# Patient Record
Sex: MALE | Race: WHITE | NOT HISPANIC OR LATINO | Employment: OTHER | URBAN - METROPOLITAN AREA
[De-identification: names, ages, dates, MRNs, and addresses within clinical notes are randomized per-mention and may not be internally consistent; named-entity substitution may affect disease eponyms.]

---

## 2018-03-13 ENCOUNTER — OFFICE VISIT (OUTPATIENT)
Dept: CARDIAC SURGERY | Facility: CLINIC | Age: 83
End: 2018-03-13

## 2018-03-13 VITALS
WEIGHT: 211 LBS | SYSTOLIC BLOOD PRESSURE: 136 MMHG | HEIGHT: 69 IN | BODY MASS INDEX: 31.25 KG/M2 | TEMPERATURE: 99.4 F | HEART RATE: 81 BPM | DIASTOLIC BLOOD PRESSURE: 76 MMHG | OXYGEN SATURATION: 84 %

## 2018-03-13 DIAGNOSIS — R91.1 LUNG NODULE: Primary | ICD-10-CM

## 2018-03-13 PROCEDURE — 99203 OFFICE O/P NEW LOW 30 MIN: CPT | Performed by: THORACIC SURGERY (CARDIOTHORACIC VASCULAR SURGERY)

## 2018-03-13 RX ORDER — MELATONIN
2000 DAILY
COMMUNITY

## 2018-03-13 RX ORDER — ASPIRIN 81 MG/1
81 TABLET ORAL DAILY
COMMUNITY

## 2018-03-13 RX ORDER — METOPROLOL TARTRATE 100 MG/1
100 TABLET ORAL 2 TIMES DAILY
COMMUNITY
Start: 2018-02-14

## 2018-03-13 RX ORDER — POLYETHYLENE GLYCOL 3350 17 G/17G
17 POWDER, FOR SOLUTION ORAL DAILY
COMMUNITY

## 2018-03-13 RX ORDER — LISINOPRIL 40 MG/1
20 TABLET ORAL DAILY
COMMUNITY
Start: 2018-01-12

## 2018-03-13 RX ORDER — DOXAZOSIN 2 MG/1
2 TABLET ORAL 2 TIMES DAILY
COMMUNITY
Start: 2018-01-12

## 2018-03-13 RX ORDER — ROSUVASTATIN CALCIUM 20 MG/1
20 TABLET, COATED ORAL NIGHTLY
COMMUNITY
Start: 2018-02-14

## 2018-03-14 DIAGNOSIS — R91.8 LUNG MASS: Primary | ICD-10-CM

## 2018-03-20 ENCOUNTER — TRANSCRIBE ORDERS (OUTPATIENT)
Dept: CARDIAC SURGERY | Facility: CLINIC | Age: 83
End: 2018-03-20

## 2018-03-20 DIAGNOSIS — R91.1 LUNG NODULE: Primary | ICD-10-CM

## 2018-03-21 ENCOUNTER — DOCUMENTATION (OUTPATIENT)
Dept: OTHER | Facility: HOSPITAL | Age: 83
End: 2018-03-21

## 2018-03-21 ENCOUNTER — HOSPITAL ENCOUNTER (OUTPATIENT)
Dept: CT IMAGING | Facility: HOSPITAL | Age: 83
Discharge: HOME | End: 2018-03-21
Admitting: PHYSICIAN ASSISTANT

## 2018-03-21 VITALS
DIASTOLIC BLOOD PRESSURE: 51 MMHG | WEIGHT: 208.6 LBS | TEMPERATURE: 97.6 F | BODY MASS INDEX: 30.9 KG/M2 | SYSTOLIC BLOOD PRESSURE: 117 MMHG | HEIGHT: 69 IN | RESPIRATION RATE: 18 BRPM | HEART RATE: 77 BPM | OXYGEN SATURATION: 91 %

## 2018-03-21 DIAGNOSIS — R91.8 LUNG MASS: ICD-10-CM

## 2018-03-21 LAB
APTT PPP: 26.4 SECONDS (ref 24–31)
GLUCOSE BLDC GLUCOMTR-MCNC: 94 MG/DL (ref 70–130)
INR PPP: 1.06 (ref 0.91–1.09)
PLATELET # BLD AUTO: 185 10*3/MM3 (ref 150–450)
PROTHROMBIN TIME: 11.1 SECONDS (ref 9.6–11.5)

## 2018-03-21 PROCEDURE — 87116 MYCOBACTERIA CULTURE: CPT | Performed by: PHYSICIAN ASSISTANT

## 2018-03-21 PROCEDURE — 87206 SMEAR FLUORESCENT/ACID STAI: CPT | Performed by: PHYSICIAN ASSISTANT

## 2018-03-21 PROCEDURE — 88112 CYTOPATH CELL ENHANCE TECH: CPT | Performed by: PHYSICIAN ASSISTANT

## 2018-03-21 PROCEDURE — A9270 NON-COVERED ITEM OR SERVICE: HCPCS | Performed by: RADIOLOGY

## 2018-03-21 PROCEDURE — 87015 SPECIMEN INFECT AGNT CONCNTJ: CPT | Performed by: PHYSICIAN ASSISTANT

## 2018-03-21 PROCEDURE — 75989 ABSCESS DRAINAGE UNDER X-RAY: CPT

## 2018-03-21 PROCEDURE — 87102 FUNGUS ISOLATION CULTURE: CPT | Performed by: PHYSICIAN ASSISTANT

## 2018-03-21 PROCEDURE — 87070 CULTURE OTHR SPECIMN AEROBIC: CPT | Performed by: PHYSICIAN ASSISTANT

## 2018-03-21 PROCEDURE — 87205 SMEAR GRAM STAIN: CPT | Performed by: PHYSICIAN ASSISTANT

## 2018-03-21 PROCEDURE — 87075 CULTR BACTERIA EXCEPT BLOOD: CPT | Performed by: PHYSICIAN ASSISTANT

## 2018-03-21 PROCEDURE — 85049 AUTOMATED PLATELET COUNT: CPT | Performed by: RADIOLOGY

## 2018-03-21 PROCEDURE — 85610 PROTHROMBIN TIME: CPT | Performed by: RADIOLOGY

## 2018-03-21 PROCEDURE — 82962 GLUCOSE BLOOD TEST: CPT

## 2018-03-21 PROCEDURE — 63710000001 ALPRAZOLAM 0.5 MG TABLET: Performed by: RADIOLOGY

## 2018-03-21 PROCEDURE — C1729 CATH, DRAINAGE: HCPCS

## 2018-03-21 PROCEDURE — 85730 THROMBOPLASTIN TIME PARTIAL: CPT | Performed by: RADIOLOGY

## 2018-03-21 RX ORDER — LIDOCAINE HYDROCHLORIDE 10 MG/ML
20 INJECTION, SOLUTION INFILTRATION; PERINEURAL ONCE
Status: COMPLETED | OUTPATIENT
Start: 2018-03-21 | End: 2018-03-21

## 2018-03-21 RX ORDER — AMLODIPINE BESYLATE 10 MG/1
10 TABLET ORAL DAILY
COMMUNITY
End: 2018-07-02 | Stop reason: CLARIF

## 2018-03-21 RX ORDER — SODIUM CHLORIDE 0.9 % (FLUSH) 0.9 %
1-10 SYRINGE (ML) INJECTION AS NEEDED
Status: DISCONTINUED | OUTPATIENT
Start: 2018-03-21 | End: 2018-03-22 | Stop reason: HOSPADM

## 2018-03-21 RX ORDER — ALPRAZOLAM 0.5 MG/1
0.5 TABLET ORAL
Status: COMPLETED | OUTPATIENT
Start: 2018-03-21 | End: 2018-03-21

## 2018-03-21 RX ADMIN — LIDOCAINE HYDROCHLORIDE 20 ML: 10 INJECTION, SOLUTION INFILTRATION; PERINEURAL at 10:45

## 2018-03-21 RX ADMIN — ALPRAZOLAM 0.5 MG: 0.5 TABLET ORAL at 10:17

## 2018-03-21 NOTE — NURSING NOTE
Contacted Scotty at Dr. Mesa's office and informed him that the patient had 2 liters fluid removed via thoracentesis and that Dr. Bright is going to recommend the nodules be evaluated via Navigational Bronchoscopy.  Scotty reports that he will relay information to Dr. Mesa and get that set up for patient.

## 2018-03-21 NOTE — NURSING NOTE
Procedure complete, pt tolerated very well.  Dr. Bright removed 2L pleural fluid from right side, samples sent to lab.  Site cleaned and band-aid applied. Report called to [facility] nurse and pt returned to [facility] via hospital transport.

## 2018-03-21 NOTE — NURSING NOTE
Pt discharged to home s/p thoracentesis.  Pt tolerated procedure without complications.  Discharge instructions reviewed with patient and spouse.  Both verbalized understanding.  Transported to exit via wheelchair per Rn.

## 2018-03-21 NOTE — PROGRESS NOTES
Received phone call from Fatmata in [facility] today stating wife indicated they needed help getting more O2 at home. O2 supplier is [employer] out of [place]. Called this office and staff indicated the patient just needed to call as the office had everything they needed to service them. Went to [facility] to relay this information to patient and his wife. They v/u. Introduced lung navigator role and provided contact information. Encouraged them to call with questions or concerns.

## 2018-03-22 ENCOUNTER — TELEPHONE (OUTPATIENT)
Dept: INTERVENTIONAL RADIOLOGY/VASCULAR | Facility: HOSPITAL | Age: 83
End: 2018-03-22

## 2018-03-22 LAB
LAB AP CASE REPORT: NORMAL
Lab: NORMAL
PATH REPORT.FINAL DX SPEC: NORMAL

## 2018-03-22 NOTE — TELEPHONE ENCOUNTER
@[id number])@    Other Comments:

## 2018-03-23 ENCOUNTER — TELEPHONE (OUTPATIENT)
Dept: CARDIAC SURGERY | Facility: CLINIC | Age: 83
End: 2018-03-23

## 2018-03-23 NOTE — TELEPHONE ENCOUNTER
Lorelei called today stating that the hospital told the patient to use his O2 for the first day after the thoracentesis. She called to find out if the patient still needed to use the O2 during the day. I let her know that if his breathing was good and he was not having any trouble breathing or if he felt like he didn't need it, then he did not have to use the O2. I also let her know that if the patient felt like he needed it he could use it. She states that the patient felt like he did not need it right now and that his breathing was good. She also asked if the patient still needed to use his breathing treatments, which he uses when he starts to cough or is having problems breathing. I asked her who gave the patient the breathing treatments and she said it was [employer], and they told the patient to use them as needed. I told her to let the patient know just to use them as [employer] has directed. I also let her know that if the patient's breathing were to worsen with use of O2 or if he felt like the O2 would not help, he should go to the ER immediately. She verbalized understanding and agreed to all of the information listed above.

## 2018-03-25 LAB
BACTERIA FLD CULT: NORMAL
GRAM STN SPEC: NORMAL

## 2018-03-28 ENCOUNTER — TELEPHONE (OUTPATIENT)
Dept: CARDIAC SURGERY | Facility: CLINIC | Age: 83
End: 2018-03-28

## 2018-03-28 LAB
BACTERIA SPEC ANAEROBE CULT: NORMAL
GIE STN SPEC: NORMAL

## 2018-03-28 NOTE — TELEPHONE ENCOUNTER
Patients spouse called to find out about biopsy, when and where it'll be done and states patient doesn't want to have in [place] as previously suggested. She also wanted to know what the results of the thoracentesis fluid. I told the patient we cannot give results over the phone and it would be discussed in office setting.     I have informed Scotty to contact the patient regarding biopsy and f/u.

## 2018-03-29 ENCOUNTER — TELEPHONE (OUTPATIENT)
Dept: CARDIAC SURGERY | Facility: CLINIC | Age: 83
End: 2018-03-29

## 2018-03-30 ENCOUNTER — TELEPHONE (OUTPATIENT)
Dept: CARDIAC SURGERY | Facility: CLINIC | Age: 83
End: 2018-03-30

## 2018-04-03 ENCOUNTER — TELEPHONE (OUTPATIENT)
Dept: CARDIAC SURGERY | Facility: CLINIC | Age: 83
End: 2018-04-03

## 2018-04-03 NOTE — TELEPHONE ENCOUNTER
S/w pts wife and gave her the recommendations of the Tumor Board. Dr Mesa has stated that if the patient wishes to have treatment we can refer him to an oncologists , if the patient decided not to we could see on a PRN basis if the patient became short of breath and we could look into placing a pleur x cath at that time. She understood and said she would talk to Tadeo about his wishes.

## 2018-04-17 ENCOUNTER — DOCUMENTATION (OUTPATIENT)
Dept: OTHER | Facility: HOSPITAL | Age: 83
End: 2018-04-17

## 2018-04-17 ENCOUNTER — OFFICE VISIT (OUTPATIENT)
Dept: PULMONOLOGY | Facility: CLINIC | Age: 83
End: 2018-04-17

## 2018-04-17 VITALS
WEIGHT: 207 LBS | BODY MASS INDEX: 30.66 KG/M2 | SYSTOLIC BLOOD PRESSURE: 130 MMHG | OXYGEN SATURATION: 90 % | TEMPERATURE: 98.4 F | HEIGHT: 69 IN | HEART RATE: 76 BPM | DIASTOLIC BLOOD PRESSURE: 82 MMHG

## 2018-04-17 DIAGNOSIS — J90 PLEURAL EFFUSION ON RIGHT: ICD-10-CM

## 2018-04-17 DIAGNOSIS — J44.9 CHRONIC OBSTRUCTIVE PULMONARY DISEASE, UNSPECIFIED COPD TYPE (HCC): ICD-10-CM

## 2018-04-17 DIAGNOSIS — R91.1 LUNG NODULE: Primary | ICD-10-CM

## 2018-04-17 PROBLEM — R91.8 MASS OF UPPER LOBE OF RIGHT LUNG: Status: ACTIVE | Noted: 2018-03-13

## 2018-04-17 PROCEDURE — 94060 EVALUATION OF WHEEZING: CPT | Performed by: INTERNAL MEDICINE

## 2018-04-17 PROCEDURE — 99204 OFFICE O/P NEW MOD 45 MIN: CPT | Performed by: INTERNAL MEDICINE

## 2018-04-17 PROCEDURE — 94729 DIFFUSING CAPACITY: CPT | Performed by: INTERNAL MEDICINE

## 2018-04-17 PROCEDURE — 94726 PLETHYSMOGRAPHY LUNG VOLUMES: CPT | Performed by: INTERNAL MEDICINE

## 2018-04-17 RX ORDER — ALBUTEROL SULFATE 90 UG/1
4 AEROSOL, METERED RESPIRATORY (INHALATION) ONCE
Status: COMPLETED | OUTPATIENT
Start: 2018-04-17 | End: 2018-04-17

## 2018-04-17 RX ORDER — BIMATOPROST 0.01 %
DROPS OPHTHALMIC (EYE)
COMMUNITY
Start: 2018-03-22

## 2018-04-17 RX ORDER — ALBUTEROL SULFATE 2.5 MG/3ML
2.5 SOLUTION RESPIRATORY (INHALATION) EVERY 4 HOURS PRN
COMMUNITY

## 2018-04-17 RX ADMIN — ALBUTEROL SULFATE 4 PUFF: 90 AEROSOL, METERED RESPIRATORY (INHALATION) at 10:29

## 2018-04-17 NOTE — PROGRESS NOTES
Initial Pulmonary Consult Note    Subjective   Reason for consultation/Chief Complaint: Shortness of Breath    Tadeo Prieto is a 82 y.o. male is being seen for consultation today at the request of Omid Mesa MD    History of Present Illness  Mr. Prieto is an 81yo M with a history of tobacco abuse and asbestos exposure who presents as a referral for a right upper lobe lung mass and pleural effusion. He was admitted to the hospital in [place], [place] in November for pneumonia. He also has some associated weight loss. He had CXR while in the hospital which showed a right upper lobe opacity. A CT scan was then performed which showed a spiculated right upper lobe mass. He had a right pleural effusion concerning for malignant pleural effusion for which he underwent thoracentesis on 3/21/18 with 2L of fluid removed. Cytology was sent and was negative for malignancy. He did not have a CT guided biopsy performed of the mass as the radiologist told him it was not accessible. He is referred here today for further evaluation.     Since he underwent the thoracentesis, his breathing has slowly worsened again. He becomes very short of breath with any exertion. He does not some weight loss. He has not had any hemoptysis. He tells me that he is very sure that he is not interested in pursing chemotherapy.       Active Ambulatory Problems     Diagnosis Date Noted   • Mass of upper lobe of right lung 03/13/2018   • Pleural effusion on right 04/17/2018   • COPD (chronic obstructive pulmonary disease) 04/17/2018     Resolved Ambulatory Problems     Diagnosis Date Noted   • No Resolved Ambulatory Problems     Past Medical History:   Diagnosis Date   • Arthritis    • Atrial fibrillation    • Cataract    • COPD (chronic obstructive pulmonary disease)    • Cough 11/2017   • Diabetes mellitus    • Dyslipidemia    • Enlarged prostate    • GERD (gastroesophageal reflux disease)    • Hypertension    • On home oxygen therapy    • Sleep  "apnea        Past Surgical History:   Procedure Laterality Date   • CATARACT EXTRACTION Bilateral    • KNEE SURGERY Bilateral    • PROSTATE SURGERY     • SHOULDER SURGERY Left        Family History   Problem Relation Age of Onset   • Heart attack Mother    • No Known Problems Father    • Cancer Sister    • COPD Maternal Grandmother        Social History     Social History   • Marital status:      Spouse name: N/A   • Number of children: 2   • Years of education: N/A     Occupational History   • retired/[employer]      Social History Main Topics   • Smoking status: Former Smoker     Packs/day: 1.50     Years: 30.00     Types: Cigarettes     Quit date: 1995   • Smokeless tobacco: Current User      Comment: stopped about 22 yrs ago   • Alcohol use Yes      Comment: a beer now and then   • Drug use: No   • Sexual activity: Defer     Other Topics Concern   • Not on file     Social History Narrative   • No narrative on file       Allergies   Allergen Reactions   • Oxycodone Anaphylaxis     \"shut all his organs down\"       Current Outpatient Prescriptions   Medication Sig Dispense Refill   • albuterol (PROVENTIL) (2.5 MG/3ML) 0.083% nebulizer solution Take 2.5 mg by nebulization Every 4 (Four) Hours As Needed for Wheezing.     • amLODIPine (NORVASC) 10 MG tablet Take 10 mg by mouth Daily.     • aspirin 81 MG EC tablet Take 81 mg by mouth Daily.     • Calcium Polycarbophil (FIBER-CAPS PO) Take  by mouth.     • cholecalciferol (VITAMIN D3) 1000 units tablet Take 2,000 Units by mouth Daily.     • doxazosin (CARDURA) 2 MG tablet Take 2 mg by mouth 2 (Two) Times a Day.     • lisinopril (PRINIVIL,ZESTRIL) 40 MG tablet Take 40 mg by mouth Daily.     • LUMIGAN 0.01 % ophthalmic drops      • metFORMIN (GLUCOPHAGE) 1000 MG tablet Take 1,000 mg by mouth Every Evening.     • metoprolol tartrate (LOPRESSOR) 100 MG tablet Take 100 mg by mouth 2 (Two) Times a Day. 1/2 tablet in the morning, a whole tablet at night     • " "NEXIUM 40 MG capsule Take 40 mg by mouth Every Night.     • polyethylene glycol (MIRALAX) packet Take 17 g by mouth Daily.     • rosuvastatin (CRESTOR) 20 MG tablet Take 20 mg by mouth Every Night.       No current facility-administered medications for this visit.        Review of Systems   Constitutional: Positive for appetite change.   HENT: Positive for hearing loss, rhinorrhea and tinnitus.    Eyes: Negative.    Respiratory: Negative.    Cardiovascular: Positive for palpitations.   Gastrointestinal: Negative.    Endocrine: Negative.    Genitourinary: Negative.    Musculoskeletal: Positive for back pain.   Skin: Negative.    Allergic/Immunologic: Negative.    Neurological: Negative.    Hematological: Bruises/bleeds easily.   Psychiatric/Behavioral: Negative.          Objective   Blood pressure 130/82, pulse 76, temperature 98.4 °F (36.9 °C), height 175.3 cm (69\"), weight 93.9 kg (207 lb), SpO2 90 %.  Physical Exam   Constitutional: He is oriented to person, place, and time. He appears well-developed and well-nourished. No distress.   HENT:   Head: Normocephalic and atraumatic.   Mouth/Throat: Oropharynx is clear and moist.   Eyes: Conjunctivae are normal. Pupils are equal, round, and reactive to light. No scleral icterus.   Neck: Normal range of motion. Neck supple. No tracheal deviation present. No thyromegaly present.   Cardiovascular: Normal rate, regular rhythm and normal heart sounds.    Pulmonary/Chest: Effort normal. No respiratory distress. He has decreased breath sounds in the right middle field and the right lower field.   Abdominal: Soft. Bowel sounds are normal. There is no tenderness.   Musculoskeletal: Normal range of motion. He exhibits no edema.   Lymphadenopathy:     He has no cervical adenopathy.   Neurological: He is alert and oriented to person, place, and time. He exhibits normal muscle tone. Coordination normal.   Skin: Skin is warm and dry. No rash noted. No erythema.   Psychiatric: He has " a normal mood and affect. His speech is normal and behavior is normal. Judgment normal.   Nursing note and vitals reviewed.      PFTs:  Performed in clinic and personally reviewed.   There is moderate airway obstruction. There is no significant response to bronchodilators.   There is mild restriction.   DLCO is reduced.     Imaging:  CXR performed in clinic today and personally reviewed. This is a PA and lateral film. There is a right upper lobe mass consistent with a previously identified mass. There has been interval recurrence of a right pleural effusion. This is moderate in size. The left lung appears clear. There is no effusion on the left.     Impression: Recurrence of moderate sized right pleural effusion. The previously identified right upper lobe mass remains.     I have also reviewed the CT scan from the OSH which shows a spiculated right upper lobe mass with what appears to be pleural involvement. There is a moderate right pleural effusion.     Assessment/Plan   Tadeo was seen today for lung nodule.    Diagnoses and all orders for this visit:    Lung nodule  -     albuterol (PROVENTIL HFA;VENTOLIN HFA) inhaler 4 puff; Inhale 4 puffs 1 (One) Time.  -     Pulmonary Function Test  -     XR Chest PA & Lateral    Pleural effusion on right    Chronic obstructive pulmonary disease, unspecified COPD type        Discussion:  Mr. Prieto is an 81yo M with a history of tobacco abuse who is referred for a right upper lobe lung mass. He had a thoracentesis performed with negative cytology. The right upper lobe mass appears to have some pleural involvement. It would be possible to perform navigational bronchoscopy to sample the right upper lobe mass for diagnosis. At this time, the patient is not interested in pursuing any type of chemotherapy (even palliative). A CXR performed in clinic showed a reaccumulation of the previously drained right pleural effusion.     Plan:  1. I have discussed with Mr. Prieto the  possibility of biopsy today in clinic. At this time, he does not wish to pursue any type of biopsy as he is not interested in chemotherapy.   2. Will need PleurX catheter placement by Dr. Mesa. Cytology should be sent again at the time of this procedure.   3. I have explained to Mr. Prieto that if he changes his mind about chemotherapy, he should contact us so that we may proceed with biopsy.   4. Continue nebulizer therapy for COPD  5. He may follow up as needed         Delmi V Case, DO  Pulmonary and Critical Care Medicine

## 2018-04-17 NOTE — PROGRESS NOTES
Met patient and wife in lung nodule clinic with Dr. Resendez. Previously met with patient in [facility] when right pleural effusion drained. He comes today to discuss navigational bronchoscopy. Patient is not willing to undergo chemotherapy or treatment for cancer and decided biopsy was not needed if he is not going to pursue treatment. States breathing has worsened again. CXR obtained in office and pleural effusion has returned. Will send patient back to Dr. Mesa for PleurX placement. Should patient change his mind about treatment he was instructed to call and biopsy can be set up. He and wife v/u. They know to call nurse navigator as needed.

## 2018-04-20 ENCOUNTER — TELEPHONE (OUTPATIENT)
Dept: OTHER | Facility: HOSPITAL | Age: 83
End: 2018-04-20

## 2018-04-20 ENCOUNTER — PREP FOR SURGERY (OUTPATIENT)
Dept: OTHER | Facility: HOSPITAL | Age: 83
End: 2018-04-20

## 2018-04-20 DIAGNOSIS — R91.8 MASS OF UPPER LOBE OF RIGHT LUNG: Primary | ICD-10-CM

## 2018-04-20 DIAGNOSIS — J90 PLEURAL EFFUSION: Primary | ICD-10-CM

## 2018-04-20 NOTE — TELEPHONE ENCOUNTER
Wife called inquiring about schedule for PleurX insertion. Notified Dr. Mesa and his office on 04/18/2018 of Dr. Resendez's referral back for pleurX placement and they are working on this. Wife also had questions about time frames and expectations for patient's health going forward. Talked with her about possibility of med/onc referral to discuss these questions and to also help provide supportive care. She is agreeable. Discussed with Dr. Resendez and notified her office for referral to [facility] in [place]. Called and spoke with MARIANELA Thomas at [facility] to explain situation and she was agreeable to referral.

## 2018-04-23 ENCOUNTER — TRANSCRIBE ORDERS (OUTPATIENT)
Dept: PULMONOLOGY | Facility: CLINIC | Age: 83
End: 2018-04-23

## 2018-04-23 RX ORDER — CEFAZOLIN SODIUM 2 G/100ML
2 INJECTION, SOLUTION INTRAVENOUS ONCE
Status: CANCELLED | OUTPATIENT
Start: 2018-04-25 | End: 2018-04-25

## 2018-04-25 ENCOUNTER — APPOINTMENT (OUTPATIENT)
Dept: GENERAL RADIOLOGY | Facility: HOSPITAL | Age: 83
End: 2018-04-25

## 2018-04-25 ENCOUNTER — HOSPITAL ENCOUNTER (OUTPATIENT)
Facility: HOSPITAL | Age: 83
Setting detail: HOSPITAL OUTPATIENT SURGERY
Discharge: HOME | End: 2018-04-25
Attending: THORACIC SURGERY (CARDIOTHORACIC VASCULAR SURGERY) | Admitting: THORACIC SURGERY (CARDIOTHORACIC VASCULAR SURGERY)

## 2018-04-25 ENCOUNTER — APPOINTMENT (OUTPATIENT)
Dept: GENERAL RADIOLOGY | Facility: HOSPITAL | Age: 83
End: 2018-04-25
Attending: THORACIC SURGERY (CARDIOTHORACIC VASCULAR SURGERY)

## 2018-04-25 VITALS
HEART RATE: 83 BPM | OXYGEN SATURATION: 86 % | SYSTOLIC BLOOD PRESSURE: 118 MMHG | DIASTOLIC BLOOD PRESSURE: 64 MMHG | TEMPERATURE: 97.2 F | RESPIRATION RATE: 16 BRPM

## 2018-04-25 DIAGNOSIS — R91.8 LUNG MASS: ICD-10-CM

## 2018-04-25 DIAGNOSIS — J90 PLEURAL EFFUSION: ICD-10-CM

## 2018-04-25 DIAGNOSIS — R91.8 MASS OF UPPER LOBE OF RIGHT LUNG: ICD-10-CM

## 2018-04-25 LAB — GLUCOSE BLDC GLUCOMTR-MCNC: 77 MG/DL (ref 70–130)

## 2018-04-25 PROCEDURE — 93010 ELECTROCARDIOGRAM REPORT: CPT | Performed by: INTERNAL MEDICINE

## 2018-04-25 PROCEDURE — 25010000003 CEFAZOLIN PER 500 MG: Performed by: THORACIC SURGERY (CARDIOTHORACIC VASCULAR SURGERY)

## 2018-04-25 PROCEDURE — 82962 GLUCOSE BLOOD TEST: CPT

## 2018-04-25 PROCEDURE — 71045 X-RAY EXAM CHEST 1 VIEW: CPT

## 2018-04-25 PROCEDURE — C1729 CATH, DRAINAGE: HCPCS | Performed by: THORACIC SURGERY (CARDIOTHORACIC VASCULAR SURGERY)

## 2018-04-25 PROCEDURE — 32550 INSERT PLEURAL CATH: CPT | Performed by: THORACIC SURGERY (CARDIOTHORACIC VASCULAR SURGERY)

## 2018-04-25 PROCEDURE — S0260 H&P FOR SURGERY: HCPCS | Performed by: THORACIC SURGERY (CARDIOTHORACIC VASCULAR SURGERY)

## 2018-04-25 PROCEDURE — 88305 TISSUE EXAM BY PATHOLOGIST: CPT | Performed by: THORACIC SURGERY (CARDIOTHORACIC VASCULAR SURGERY)

## 2018-04-25 PROCEDURE — 93005 ELECTROCARDIOGRAM TRACING: CPT | Performed by: THORACIC SURGERY (CARDIOTHORACIC VASCULAR SURGERY)

## 2018-04-25 PROCEDURE — 88112 CYTOPATH CELL ENHANCE TECH: CPT | Performed by: THORACIC SURGERY (CARDIOTHORACIC VASCULAR SURGERY)

## 2018-04-25 RX ORDER — CEFAZOLIN SODIUM 2 G/100ML
2 INJECTION, SOLUTION INTRAVENOUS ONCE
Status: DISCONTINUED | OUTPATIENT
Start: 2018-04-25 | End: 2018-04-25 | Stop reason: HOSPADM

## 2018-04-25 RX ADMIN — CEFAZOLIN 2000 MG: 1 INJECTION, POWDER, FOR SOLUTION INTRAVENOUS at 12:40

## 2018-04-25 NOTE — H&P
Cardiothoracic Surgery History & Physical           Chief complaint: Shortness of breath    HPI: 82 year old male with a history of hypertension, hypercholesterolemia, diabetes, atrial fibrillation, remote tobacco abuse and COPD who presents with shortness of breath.  He was admitted to the hospital in [place] back in November for four days with pneumonia.  He denies fever, chills, adenopathy or hemoptysis.  Tadeo has lost approximately eight pounds over a six month period.  He does have a cough with yellow-tinged sputum, which he says that he gets every winter.  While hospitalized, a chest x-ray revealed a right upper lobe opacity.  He had a CT which showed a spiculated right upper lobe mass.  A CT guided thoracentesis was performed 3/21/18, where 2 liters of fluid was removed.  This was negative for malignancy on cytology and a needle biopsy of the lung was not performed.      Past Medical History:   Diagnosis Date   • Arthritis    • Atrial fibrillation    • Cataract    • COPD (chronic obstructive pulmonary disease)    • Cough 11/2017   • Diabetes mellitus    • Dyslipidemia    • Enlarged prostate    • GERD (gastroesophageal reflux disease)    • Hypertension    • On home oxygen therapy     2 1/2 liters at night   • Pleural effusion    • Sleep apnea      Past Surgical History:   Procedure Laterality Date   • CATARACT EXTRACTION Bilateral    • KNEE SURGERY Bilateral    • PROSTATE SURGERY     • SHOULDER SURGERY Left      Family History   Problem Relation Age of Onset   • Heart attack Mother    • No Known Problems Father    • Cancer Sister    • COPD Maternal Grandmother      Social History   Substance Use Topics   • Smoking status: Former Smoker     Packs/day: 1.50     Years: 30.00     Types: Cigarettes     Quit date: 1995   • Smokeless tobacco: Current User      Comment: stopped about 22 yrs ago   • Alcohol use Yes      Comment: a beer now and then       No prescriptions prior to admission.     Allergies:   Oxycodone    Review of Systems:    A comprehensive review of systems was negative except for:   Constitutional: positive for fatigue and weight loss  Respiratory: positive for cough, dyspnea on exertion, hemoptysis, pneumonia and sputum  Cardiovascular: positive for dyspnea and lower extremity edema  Hematologic/lymphatic: positive for bleeding and easy bruising    All other systems were reviewed and are negative.    Vital Signs:  Temp:  [97.2 °F (36.2 °C)] 97.2 °F (36.2 °C)  Heart Rate:  [72-83] 83  Resp:  [16-20] 16  BP: (117-182)/(57-84) 118/64    Physical Exam:  Physical Exam   Constitutional: He is oriented to person, place, and time. He appears well-developed and well-nourished. No distress.   HENT:   Head: Normocephalic and atraumatic.   Eyes: Conjunctivae are normal. No scleral icterus.   Neck: Neck supple. No JVD present. No tracheal deviation present.   No carotid bruits bilaterally   Cardiovascular: Normal rate, regular rhythm and normal heart sounds.  Exam reveals no gallop and no friction rub.    No murmur heard.  Pulmonary/Chest: Effort normal. No respiratory distress. He has no wheezes. He has no rales.   Decreased breath sounds at the right lung base.     Abdominal: Soft. He exhibits no distension and no mass. There is no tenderness. There is no rebound and no guarding.   Musculoskeletal: Normal range of motion. He exhibits no edema.   Neurological: He is alert and oriented to person, place, and time.   Skin: Skin is warm and dry. No rash noted. He is not diaphoretic. No erythema.   Psychiatric: He has a normal mood and affect. His behavior is normal. Judgment and thought content normal.       Assessment/Plan: 82 year old male with a history of hypertension, hypercholesterolemia, diabetes, atrial fibrillation, remote tobacco abuse and COPD who presents with shortness of breath.  He has a large recurrent right pleural effusion and lung mass concerning for malignancy.  It is possible this a  parapneumonic effusion with lung consolidation, but for now, this is a malignancy until proven otherwise.  If this effusion is malignant, this would represent a [id number] Stage Bashir malignancy.  I recommended a lung biopsy at the time of thoracentesis, but this was not done.  Given his recurrent effusion, the plan will be to proceed with right PleurX catheter insertion.  The risks and benefits of the procedure were discussed with the patient including pain, bleeding, infection, poorly drained effusion, myocardial infarction and death.  The patient understood these risks and wished to proceed with surgery.  This fluid will be sent again for cytology and he will need to be evaluated by a medical oncologist for possible chemotherapy.  If the cytology is negative, I will need to arrange for a CT guided lung biopsy.      Omid Mesa MD  04/25/18

## 2018-04-25 NOTE — OP NOTE
DATE OF PROCEDURE: 4/25/2018      PREOPERATIVE DIAGNOSES:  1. Recurrent right pleural effusion  2. Right lung nodule  3. Remote tobacco abuse  4. Hypertension  5. Hypercholesterolemia  6. Diabetes  7. Atrial fibrillation  8. COPD      POSTOPERATIVE DIAGNOSES:    1. Recurrent right pleural effusion  2. Right lung nodule  3. Remote tobacco abuse  4. Hypertension  5. Hypercholesterolemia  6. Diabetes  7. Atrial fibrillation  8. COPD      PROCEDURES PERFORMED:    1. Right PleurX catheter insertion     SURGEON: Omid Mesa MD        ASSISTANTS:  Sridhar Phan MD      ANESTHESIA: 1% lidocaine local anesthetic      ESTIMATED BLOOD LOSS: Minimal       INDICATIONS: 82 year old male with a history of hypertension, hypercholesterolemia, diabetes, atrial fibrillation, remote tobacco abuse and COPD who presented with shortness of breath.  He is status post thoracentesis with a recurrent right effusion and shortness of breath.  The patient is a reasonable candidate for right PleurX catheter insertion.  The patient is unsure at this time if he wished to pursue additional treatment other than this palliative PleurX catheter to help with dyspnea.   The risks and benefits of surgery were discussed with the patient including pain, bleeding, infection, myocardial infarction and death. The patient understood these risks and wished to proceed with surgery.       DESCRIPTION OF PROCEDURE:  The patient was taken to the endoscopy suite. Bedside ultrasound was performed that revealed a large right pleural effusion above the diaphragm.  The chest was marked for needle insertion just above the diaphragm.  He was prepped and draped in the usual sterile fashion and a timeout was performed including the patient's name, procedure and antibiotic administration.  1% lidocaine was infiltrated at this insertion site and needle access of the right pleural effusion was obtained with a yield of thin tea colored fluid.  A wire was easily advanced in the  chest and the needle removed.  This site was enlarged with an 11 blade.  Additional 1% lidocaine was infiltrated further anteriorly and a counterincision made.  The tunneler was placed anteriorly and externalized through the posterior incision.  The Pleurx catheter was pulled into position with the cotton cuff just below the anterior incision.  The breakaway sheath was then advanced over the wire using modified Seldinger technique and the dilator and wire were removed.  The Pleurx catheter was placed within the chest and the breakaway sheath removed.  Approximately 2100 cc of thin serosanguinous fluid was removed on suction.  The posterior incision was closed with a single interrupted 4-0 Vicryl suture and overlying skin glue applied.  The PleurX was secured anteriorly at the exit site with a 0 Silk suture.  The PleurX catheter was capped and overlying gauze and tegaderm applied.  The patient tolerated the procedure well and was transported to the recovery room in stable condition.

## 2018-04-25 NOTE — DISCHARGE INSTRUCTIONS
Chest Tube Insertion, Adult  A chest tube is a thin, flexible tube that is inserted into the space between your lung and your chest wall. You may need a chest tube if you have a collapsed lung from illness or a severe injury. A collapsed lung can be caused by:  · An air leak (pneumothorax).  · Blood collection (hemothorax).  · Fluid buildup from an infection (empyema).  The chest tube drains the fluid or air from your lung. It may be attached to a suction device to help with drainage. You will need to stay in the hospital while the chest tube is in place.  Tell a health care provider about:  · Any allergies you have.  · All medicines you are taking, including vitamins, herbs, eye drops, creams, and over-the-counter medicines.  · Any problems you or family members have had with anesthetic medicines.  · Any blood disorders you have.  · Any surgeries you have had.  · Any medical conditions you have, including any recent fever or cold symptoms.  · Whether you are pregnant or may be pregnant.  What are the risks?  Generally, this is a safe procedure. However, problems may occur, including:  · Bleeding.  · Infection.  · Allergic reaction to medicines.  · Lung damage.  · Damage to the blood vessels or nerves near the lung.  · Failure of the chest tube to work properly.  What happens before the procedure?  Staying hydrated   Follow instructions from your health care provider about hydration, which may include:  · Up to 2 hours before the procedure - you may continue to drink clear liquids, such as water, clear fruit juice, black coffee, and plain tea.  Eating and drinking restrictions   Follow instructions from your health care provider about eating and drinking, which may include:  · 8 hours before the procedure - stop eating heavy meals or foods such as meat, fried foods, or fatty foods.  · 6 hours before the procedure - stop eating light meals or foods, such as toast or cereal.  · 6 hours before the procedure - stop  drinking milk or drinks that contain milk.  · 2 hours before the procedure - stop drinking clear liquids.  Medicines   · Ask your health care provider about:  ¨ Changing or stopping your regular medicines. This is especially important if you are taking diabetes medicines or blood thinners.  ¨ Taking medicines such as aspirin and ibuprofen. These medicines can thin your blood. Do not take these medicines before your procedure if your health care provider instructs you not to.  General instructions   · You will have a chest X-ray or other imaging studies of the lung.  · Plan to have someone take you home from the hospital or clinic.  · If you will be going home right after the procedure, plan to have someone with you for 24 hours.  What happens during the procedure?  · To reduce your risk of infection:  ¨ Your health care team will wash or sanitize their hands.  ¨ Your skin will be washed with soap.  ¨ Hair may be removed from the surgical area.  · An IV tube will be inserted into one of your veins.  · You will be given one or more of the following:  ¨ A medicine to help you relax (sedative).  ¨ A medicine to numb the area (local anesthetic).  · You may be given antibiotic and pain medicines through the IV tube.  · The surgeon will make a small incision in a space between your ribs.  · The chest tube will be placed through the incision into the space between the lung and your chest wall.  · Stitches (sutures) will be used to close the incision around the tube.  · The chest tube may be attached to a suction device.  · The incision site will be covered with an airtight bandage (dressing).  · Another chest X-ray will be done to check the position of the tube.  The procedure may vary among health care providers and hospitals.  What happens after the procedure?  · Your blood pressure, heart rate, breathing rate, and blood oxygen level will be monitored until the medicines you were given have worn off.  · You may continue  to get pain medicine or antibiotics through the IV tube.  · The tube and dressing will be checked regularly.  · You will be encouraged to cough and take deep breaths.  · You may be given oxygen to breathe.  · Chest X-rays will be done to find out if the lung is inflating. After the lung is inflated:  ¨ Another chest X-ray may be done.  ¨ The chest tube can be removed after the lung remains inflated and you are breathing easily.  ¨ A new dressing will be put on.  · Do not drive for 24 hours if you were given a sedative.  This information is not intended to replace advice given to you by your health care provider. Make sure you discuss any questions you have with your health care provider.  Document Released: 03/27/2008 Document Revised: 07/07/2017 Document Reviewed: 05/31/2017  [employer] Patient Education © 2017 [employer]

## 2018-04-26 NOTE — PROGRESS NOTES
4-26-18  Spoke with Endoscopy who states that Elaine from Dr. Mesa's office handled the HH referral and order of PleurX Drain Kits on 4-25-18.  Call to Elaine at Dr. Mesa's office.  She states she called Grace with [facility] as this patient is under a  POC with them.  She faxed the order and the Pleurx Order forms to Grace at [facility].  They will be faxing the orders to patient's insurance.

## 2018-04-27 LAB
LAB AP CASE REPORT: NORMAL
Lab: NORMAL
PATH REPORT.FINAL DX SPEC: NORMAL

## 2018-05-02 ENCOUNTER — TELEPHONE (OUTPATIENT)
Dept: CARDIAC SURGERY | Facility: CLINIC | Age: 83
End: 2018-05-02

## 2018-05-02 LAB
FUNGUS WND CULT: NORMAL
MYCOBACTERIUM SPEC CULT: NORMAL
NIGHT BLUE STAIN TISS: NORMAL

## 2018-05-02 NOTE — TELEPHONE ENCOUNTER
S/w pts who said Dr. Mesa told them that we would arrange a follow up after pleurx. Made for 5/8. Told pt results could not be given over the phone.

## 2018-05-04 ENCOUNTER — TELEPHONE (OUTPATIENT)
Dept: CARDIAC SURGERY | Facility: CLINIC | Age: 83
End: 2018-05-04

## 2018-05-04 NOTE — TELEPHONE ENCOUNTER
I called to change Mr Piedra's time for his appointment on 5/8/18. I spoke to his wife, she wanted me to note in his chart that they were very upset with our office and Dr Mesa because they have not been given any pathology results.

## 2018-05-04 NOTE — TELEPHONE ENCOUNTER
S/w pts wife to regarding pts post op apt on 5/8/18 @ 11:15, wife would like to cx due to saying pts  @ an cancer center in [place] told pt they will find a doctor closer for pt doc that told pt this Benja Crockett Md.

## 2018-06-06 ENCOUNTER — TELEPHONE (OUTPATIENT)
Dept: CARDIAC SURGERY | Facility: CLINIC | Age: 83
End: 2018-06-06

## 2018-06-06 NOTE — TELEPHONE ENCOUNTER
[facility] called to ask a question regarding pleurx catheter and patients next appointment. I advised her to have Mr. Prieto to call and reschedule his appointment which was cancelled previously because the wife is stating they are looking for a physician closer to home.     [facility] informed me she will keep the drains 3 times a week because it is still draining (approx 50 ml each time) because pt has not had a fu appointment with Dr. Mesa.

## 2018-06-07 ENCOUNTER — TELEPHONE (OUTPATIENT)
Dept: CARDIAC SURGERY | Facility: CLINIC | Age: 83
End: 2018-06-07

## 2018-06-07 NOTE — TELEPHONE ENCOUNTER
"The pt's wife called this morning wanting to schedule an appt for her . She was very upset and stated \" We have always had to call to get my  an appt, you all have never once call us for an appt.\" I let the pt know that I would have to speak with our , Francine, Because Dr. Mesa schedule is a little full at this time but we will add the pt on to be see as soon as we can. She stated \" well you make sure you call me because we always have to call you all to get his appts.\" When I looked at the pt's past appts, the pt has only been seen once in our office and then had a procedure that following month. The pt was scheduled for an appt in may but the pt's wife CX that appt and was trying to find a provider closer to them. I let the pt's wife know that the pt's appt was CX in May because she had called and CX that appt with that information. She stated that no she that was not the reason she had CX his appt. I gave this information to my , she has put the pt on to see our PA, Roverto Leon on 06/19/2018. The pt's wife is aware of this appt date and time.   "

## 2018-06-19 ENCOUNTER — OFFICE VISIT (OUTPATIENT)
Dept: CARDIAC SURGERY | Facility: CLINIC | Age: 83
End: 2018-06-19

## 2018-06-19 VITALS
HEART RATE: 79 BPM | OXYGEN SATURATION: 89 % | SYSTOLIC BLOOD PRESSURE: 140 MMHG | HEIGHT: 69 IN | DIASTOLIC BLOOD PRESSURE: 80 MMHG | TEMPERATURE: 98.5 F | BODY MASS INDEX: 29.83 KG/M2 | WEIGHT: 201.4 LBS

## 2018-06-19 DIAGNOSIS — J90 PLEURAL EFFUSION: Primary | ICD-10-CM

## 2018-06-19 DIAGNOSIS — R91.8 MASS OF UPPER LOBE OF RIGHT LUNG: ICD-10-CM

## 2018-06-19 DIAGNOSIS — J90 PLEURAL EFFUSION ON RIGHT: ICD-10-CM

## 2018-06-19 PROCEDURE — 99213 OFFICE O/P EST LOW 20 MIN: CPT | Performed by: PHYSICIAN ASSISTANT

## 2018-06-19 PROCEDURE — 71046 X-RAY EXAM CHEST 2 VIEWS: CPT | Performed by: THORACIC SURGERY (CARDIOTHORACIC VASCULAR SURGERY)

## 2018-06-19 RX ORDER — MONTELUKAST SODIUM 10 MG/1
TABLET ORAL
COMMUNITY
Start: 2018-05-07

## 2018-06-19 RX ORDER — TORSEMIDE 10 MG/1
10 TABLET ORAL 3 TIMES WEEKLY
COMMUNITY

## 2018-06-19 NOTE — PROGRESS NOTES
"06/19/2018  Patient Information  Tadeo Prieto                                                                                          [address] [address]   1935  'PCP/Referring Physician'  Marcel Bang MD  [phone]  No ref. provider found    Chief Complaint   Patient presents with   • Shortness of Breath     Hospital follow-up s/p right pleurx catheter insertion       History of Present Illness:  Patient is an 82-year-old  male with history of hypertension, hypercholesterolemia, diabetes, atrial fibrillation, remote tobacco abuse, COPD, recurrent right pleural effusion and right upper lobe lung nodule status post right Pleurx catheter insertion performed 4/25/18.  Mr. Piedra denies any shortness of breath, chest pain or hemoptysis.  He states that his weight is stable and he denies any fatigue or difficulty with ambulation.  The patient is seen 3 times a week for his Pleurx catheter to be drained, and since the beginning of June 2018, he has had less than 50 ML's out each session.  He was being followed by pulmonologist Dr. Resendez, and during that time told her that he does not want a biopsy, because regardless of the result he does not want to receive any chemotherapy.  According to the patient, he was told by Dr. Resendez that he may have \"level IV lung cancer\", which terrified the patient and swayed him to begin considering chemotherapy, should this turn out to be cancer.  A PET scan was performed in [place] which revealed two areas of hypermetabolic activity.  A biopsy was performed by Dr. Horowitz of one of the spots, which revealed no evidence of cancer cells.  I am not privy to the images of the PET scan, as it was performed at an outside hospital.  However, I will have my office staff obtain the images, so I may personally review them.  I reviewed the patient's pleural fluid from surgery, and explained there is no evidence of malignant cells in the fluid.  The " patient is currently followed by medical oncologist Dr. Crockett in [place], [place].  Patient otherwise doing well.    Patient Active Problem List   Diagnosis   • Mass of upper lobe of right lung   • Pleural effusion on right   • COPD (chronic obstructive pulmonary disease)     Past Medical History:   Diagnosis Date   • Arthritis    • Atrial fibrillation    • Cataract    • COPD (chronic obstructive pulmonary disease)    • Cough 11/2017   • Diabetes mellitus    • Dyslipidemia    • Enlarged prostate    • GERD (gastroesophageal reflux disease)    • Hypertension    • On home oxygen therapy     2 1/2 liters at night   • Pleural effusion    • Sleep apnea      Past Surgical History:   Procedure Laterality Date   • CATARACT EXTRACTION Bilateral    • KNEE SURGERY Bilateral    • PLEURAL CATHETER INSERTION Right 4/25/2018    Procedure: PLEURX CATHETER INSERTION RIGHT;  Surgeon: Omid Mesa MD;  Location: [facility];  Service: Cardiothoracic   • PROSTATE SURGERY     • SHOULDER SURGERY Left        Current Outpatient Prescriptions:   •  albuterol (PROVENTIL) (2.5 MG/3ML) 0.083% nebulizer solution, Take 2.5 mg by nebulization Every 4 (Four) Hours As Needed for Wheezing., Disp: , Rfl:   •  aspirin 81 MG EC tablet, Take 81 mg by mouth Daily., Disp: , Rfl:   •  Calcium Polycarbophil (FIBER-CAPS PO), Take  by mouth., Disp: , Rfl:   •  cholecalciferol (VITAMIN D3) 1000 units tablet, Take 2,000 Units by mouth Daily., Disp: , Rfl:   •  doxazosin (CARDURA) 2 MG tablet, Take 2 mg by mouth 2 (Two) Times a Day., Disp: , Rfl:   •  lisinopril (PRINIVIL,ZESTRIL) 40 MG tablet, Take 20 mg by mouth Daily., Disp: , Rfl:   •  LUMIGAN 0.01 % ophthalmic drops, , Disp: , Rfl:   •  metFORMIN (GLUCOPHAGE) 1000 MG tablet, Take 1,000 mg by mouth Every Evening., Disp: , Rfl:   •  metoprolol tartrate (LOPRESSOR) 100 MG tablet, Take 100 mg by mouth 2 (Two) Times a Day. 1/2 tablet in the morning, a whole tablet at night, Disp: , Rfl:   •  montelukast  "(SINGULAIR) 10 MG tablet, , Disp: , Rfl:   •  NEXIUM 40 MG capsule, Take 40 mg by mouth Every Night., Disp: , Rfl:   •  polyethylene glycol (MIRALAX) packet, Take 17 g by mouth Daily., Disp: , Rfl:   •  rosuvastatin (CRESTOR) 20 MG tablet, Take 20 mg by mouth Every Night., Disp: , Rfl:   •  torsemide (DEMADEX) 10 MG tablet, Take 10 mg by mouth 3 (Three) Times a Week., Disp: , Rfl:   •  amLODIPine (NORVASC) 10 MG tablet, Take 10 mg by mouth Daily., Disp: , Rfl:   Allergies   Allergen Reactions   • Oxycodone Anaphylaxis     \"shut all his organs down\"     Social History     Social History   • Marital status:      Spouse name: N/A   • Number of children: 2   • Years of education: N/A     Occupational History   • retired/[employer] de[employer]      Social History Main Topics   • Smoking status: Former Smoker     Packs/day: 1.50     Years: 30.00     Types: Cigarettes     Quit date: 1995   • Smokeless tobacco: Current User      Comment: stopped about 22 yrs ago   • Alcohol use Yes      Comment: a beer now and then   • Drug use: No   • Sexual activity: Defer     Other Topics Concern   • Not on file     Social History Narrative    Lives in [place], [place] with spouse     Family History   Problem Relation Age of Onset   • Heart attack Mother    • No Known Problems Father    • Cancer Sister    • COPD Maternal Grandmother      Review of Systems   Constitution: Negative for chills, fever, malaise/fatigue, night sweats and weight loss.   HENT: Negative for hearing loss, odynophagia and sore throat.    Cardiovascular: Positive for dyspnea on exertion and leg swelling. Negative for chest pain, orthopnea and palpitations.   Respiratory: Positive for cough, shortness of breath and wheezing. Negative for hemoptysis.    Endocrine: Negative for cold intolerance, heat intolerance, polydipsia, polyphagia and polyuria.   Hematologic/Lymphatic: Does not bruise/bleed easily.   Skin: Negative for itching and rash.   Musculoskeletal: " "Positive for back pain. Negative for joint pain, joint swelling and myalgias.   Gastrointestinal: Negative for abdominal pain, constipation, diarrhea, hematemesis, hematochezia, melena, nausea and vomiting.   Genitourinary: Positive for frequency. Negative for dysuria and hematuria.   Neurological: Negative for focal weakness, headaches, numbness and seizures.   Psychiatric/Behavioral: Negative for depression and suicidal ideas. The patient is not nervous/anxious.    All other systems reviewed and are negative.    Vitals:    18 1136   BP: 140/80   BP Location: Right arm   Patient Position: Sitting   Pulse: 79   Temp: 98.5 °F (36.9 °C)   SpO2: (!) 89%   Weight: 91.4 kg (201 lb 6.4 oz)   Height: 175.3 cm (69\")      Physical Exam:  Gen - NAD, pleasant, cooperative  CV - Regular rate and rhythm, no murmur gallop or rub  Pulm - Lungs clear to auscultation without wheeze or rhonchi   GI - Soft, normoactive bowel sounds, non-tender  Ext - Without edema   Incision - Healing well, no evidence of incisional dehiscence or cellulitis  Lymph - No Palpable axillary, supraclavicular or anterior chain lymphadenopathy present    Labs/Imagin18 CXR  Good quality chest radiograph. Bony structures are within normal limits. Trachea is midline. Mild right pleural effusion present. No evidence of pneumothorax.  Right Pleurx catheter present in right lung base.  Compared to most recent chest xray (18), there has been an interval decrease in the pleural effusion on the right, with increased lung volume on the right, as well.    Assessment/Plan:  Patient is an 82-year-old  male with history of hypertension, hypercholesterolemia, diabetes, atrial fibrillation, remote tobacco abuse, COPD, recurrent right pleural effusion and right upper lobe lung nodule status post right Pleurx catheter insertion performed 18.  Mr. Prieto's incision has healed well and his chest x-ray is within normal limits, with the exception " of mild right pleural effusion present.  His right Pleurx catheter is in good position and functioning appropriately.  Since the patient's Pleurx catheter placement, he received a PET scan which showed 2 metabolically active spots, one of which was biopsied showing no malignancy.  Our office will obtain the PET scan films from [place], [place] so I may personally review them.  The patient appears to have changed his mind about receiving chemotherapy, for which he and I had an extended conversation about -should we find this right upper lobe lung nodule to be malignant.  Patient is currently followed by Dr. Crockett, a medical oncologist in [place], [place].  I believe home health may decrease the amount of times they drain his Pleurx catheter from 3 times down to 1 time per week.  I will review the patient's scan and follow-up with the patient in the next week.  Mr. Prieto states that he is interested in intervention, should we confirm he has cancer.  He will follow-up with our office in 7 days.  If he develops any acutely worsening symptoms, such as shortness of breath or chest pain, he should call our office or present to the nearest emergency department immediately.  If Mr. Prieto has any questions or concerns about his condition or surgery, he may call our office at any time during the interval.    Patient Active Problem List   Diagnosis   • Mass of upper lobe of right lung   • Pleural effusion on right   • COPD (chronic obstructive pulmonary disease)     Signed by:

## 2018-06-26 ENCOUNTER — OFFICE VISIT (OUTPATIENT)
Dept: CARDIAC SURGERY | Facility: CLINIC | Age: 83
End: 2018-06-26

## 2018-06-26 VITALS
DIASTOLIC BLOOD PRESSURE: 74 MMHG | HEART RATE: 74 BPM | BODY MASS INDEX: 30.07 KG/M2 | SYSTOLIC BLOOD PRESSURE: 160 MMHG | OXYGEN SATURATION: 95 % | HEIGHT: 69 IN | WEIGHT: 203 LBS | TEMPERATURE: 98 F

## 2018-06-26 DIAGNOSIS — R91.8 MASS OF UPPER LOBE OF RIGHT LUNG: Primary | ICD-10-CM

## 2018-06-26 DIAGNOSIS — J90 PLEURAL EFFUSION ON RIGHT: ICD-10-CM

## 2018-06-26 PROCEDURE — 99213 OFFICE O/P EST LOW 20 MIN: CPT | Performed by: THORACIC SURGERY (CARDIOTHORACIC VASCULAR SURGERY)

## 2018-06-27 DIAGNOSIS — R91.8 MASS OF UPPER LOBE OF RIGHT LUNG: Primary | ICD-10-CM

## 2018-06-29 ENCOUNTER — TRANSCRIBE ORDERS (OUTPATIENT)
Dept: CARDIAC SURGERY | Facility: CLINIC | Age: 83
End: 2018-06-29

## 2018-06-29 DIAGNOSIS — R91.1 LUNG NODULE: Primary | ICD-10-CM

## 2018-07-02 ENCOUNTER — HOSPITAL ENCOUNTER (OUTPATIENT)
Dept: CT IMAGING | Facility: HOSPITAL | Age: 83
Discharge: HOME | End: 2018-07-02
Admitting: PHYSICIAN ASSISTANT

## 2018-07-02 VITALS
WEIGHT: 202 LBS | OXYGEN SATURATION: 93 % | DIASTOLIC BLOOD PRESSURE: 62 MMHG | SYSTOLIC BLOOD PRESSURE: 123 MMHG | HEIGHT: 69 IN | BODY MASS INDEX: 29.92 KG/M2 | TEMPERATURE: 98 F | RESPIRATION RATE: 18 BRPM | HEART RATE: 74 BPM

## 2018-07-02 DIAGNOSIS — R91.8 MASS OF UPPER LOBE OF RIGHT LUNG: ICD-10-CM

## 2018-07-02 LAB
APTT PPP: 26.5 SECONDS (ref 24–31)
GLUCOSE BLDC GLUCOMTR-MCNC: 93 MG/DL (ref 70–130)
INR PPP: 1.05 (ref 0.91–1.09)
PLATELET # BLD AUTO: 210 10*3/MM3 (ref 150–450)
PROTHROMBIN TIME: 11 SECONDS (ref 9.6–11.5)

## 2018-07-02 PROCEDURE — 88173 CYTOPATH EVAL FNA REPORT: CPT | Performed by: PHYSICIAN ASSISTANT

## 2018-07-02 PROCEDURE — 77012 CT SCAN FOR NEEDLE BIOPSY: CPT

## 2018-07-02 PROCEDURE — 88305 TISSUE EXAM BY PATHOLOGIST: CPT | Performed by: PHYSICIAN ASSISTANT

## 2018-07-02 PROCEDURE — 88360 TUMOR IMMUNOHISTOCHEM/MANUAL: CPT

## 2018-07-02 PROCEDURE — 88172 CYTP DX EVAL FNA 1ST EA SITE: CPT | Performed by: PHYSICIAN ASSISTANT

## 2018-07-02 PROCEDURE — 85049 AUTOMATED PLATELET COUNT: CPT | Performed by: RADIOLOGY

## 2018-07-02 PROCEDURE — 85610 PROTHROMBIN TIME: CPT | Performed by: RADIOLOGY

## 2018-07-02 PROCEDURE — 85730 THROMBOPLASTIN TIME PARTIAL: CPT | Performed by: RADIOLOGY

## 2018-07-02 PROCEDURE — 82962 GLUCOSE BLOOD TEST: CPT

## 2018-07-02 RX ORDER — SODIUM CHLORIDE 0.9 % (FLUSH) 0.9 %
1-10 SYRINGE (ML) INJECTION AS NEEDED
Status: DISCONTINUED | OUTPATIENT
Start: 2018-07-02 | End: 2018-07-03 | Stop reason: HOSPADM

## 2018-07-02 RX ORDER — LIDOCAINE HYDROCHLORIDE 10 MG/ML
20 INJECTION, SOLUTION INFILTRATION; PERINEURAL ONCE
Status: COMPLETED | OUTPATIENT
Start: 2018-07-02 | End: 2018-07-02

## 2018-07-02 RX ADMIN — LIDOCAINE HYDROCHLORIDE 20 ML: 10 INJECTION, SOLUTION INFILTRATION; PERINEURAL at 09:50

## 2018-07-02 NOTE — NURSING NOTE
Returned to room. Tolerated procedure well. Band aid dressing clean, dry, and intact ant upper chest. HOB elev. provided breakfast tray.

## 2018-07-02 NOTE — DISCHARGE INSTR - ACTIVITY
Rest quietly at home today. Resume light activity tomorrow. You may shower tomorrow and remove the band aid.

## 2018-07-02 NOTE — NURSING NOTE
Given printed and oral  discharge instructions. Verbalizes understanding. Discharged to front entrance with spouse per wheelchair.

## 2018-07-03 ENCOUNTER — TELEPHONE (OUTPATIENT)
Dept: INFUSION THERAPY | Facility: HOSPITAL | Age: 83
End: 2018-07-03

## 2018-07-06 ENCOUNTER — OFFICE VISIT (OUTPATIENT)
Dept: CARDIAC SURGERY | Facility: CLINIC | Age: 83
End: 2018-07-06

## 2018-07-06 VITALS
DIASTOLIC BLOOD PRESSURE: 50 MMHG | SYSTOLIC BLOOD PRESSURE: 110 MMHG | HEART RATE: 69 BPM | HEIGHT: 69 IN | BODY MASS INDEX: 29.92 KG/M2 | OXYGEN SATURATION: 94 % | WEIGHT: 202 LBS | TEMPERATURE: 98.4 F

## 2018-07-06 DIAGNOSIS — J90 PLEURAL EFFUSION ON RIGHT: Primary | ICD-10-CM

## 2018-07-06 DIAGNOSIS — R91.8 MASS OF UPPER LOBE OF RIGHT LUNG: ICD-10-CM

## 2018-07-06 PROCEDURE — 99213 OFFICE O/P EST LOW 20 MIN: CPT | Performed by: THORACIC SURGERY (CARDIOTHORACIC VASCULAR SURGERY)

## 2018-07-06 NOTE — PROGRESS NOTES
07/06/2018  Patient Information  Tadeo Prieto                                                                                          [address] [facility] [id number]   1935  'PCP/Referring Physician'  Marcel Bang MD  [phone]  No ref. provider found    Chief Complaint   Patient presents with   • Follow-up     FU with CT Needle Bx Results for Lung Mass   • Lung Nodule       History of Present Illness:  82 year old  male with a history of hypertension, hypercholesterolemia, diabetes, atrial fibrillation, remote tobacco abuse, COPD, recurrent right pleural effusion and right upper lobe lung nodule who is status post right Pleurx catheter insertion and subsequent removal.  He returns to clinic to discuss his biopsy results.  Mr. Prieto denies severe shortness of breath and is without new complaints.        Patient Active Problem List   Diagnosis   • Mass of upper lobe of right lung   • Pleural effusion on right   • COPD (chronic obstructive pulmonary disease) (CMS/HCC)     Past Medical History:   Diagnosis Date   • Arthritis    • Atrial fibrillation (CMS/HCC)    • Cataract    • COPD (chronic obstructive pulmonary disease) (CMS/HCC)    • Cough 11/2017   • Diabetes mellitus (CMS/HCC)    • Dyslipidemia    • Enlarged prostate    • GERD (gastroesophageal reflux disease)    • Hypertension    • Lung mass    • On home oxygen therapy     2 1/2 liters at night   • Pleural effusion    • Sleep apnea      Past Surgical History:   Procedure Laterality Date   • CATARACT EXTRACTION Bilateral    • KNEE SURGERY Bilateral    • PLEURAL CATHETER INSERTION Right 4/25/2018    Procedure: PLEURX CATHETER INSERTION RIGHT;  Surgeon: Omid Mesa MD;  Location: [facility];  Service: Cardiothoracic   • PROSTATE SURGERY     • SHOULDER SURGERY Left        Current Outpatient Prescriptions:   •  albuterol (PROVENTIL) (2.5 MG/3ML) 0.083% nebulizer solution, Take 2.5 mg by nebulization Every 4 (Four) Hours  "As Needed for Wheezing., Disp: , Rfl:   •  aspirin 81 MG EC tablet, Take 81 mg by mouth Daily., Disp: , Rfl:   •  Calcium Polycarbophil (FIBER-CAPS PO), Take  by mouth., Disp: , Rfl:   •  cholecalciferol (VITAMIN D3) 1000 units tablet, Take 2,000 Units by mouth Daily., Disp: , Rfl:   •  doxazosin (CARDURA) 2 MG tablet, Take 2 mg by mouth 2 (Two) Times a Day., Disp: , Rfl:   •  lisinopril (PRINIVIL,ZESTRIL) 40 MG tablet, Take 20 mg by mouth Daily., Disp: , Rfl:   •  LUMIGAN 0.01 % ophthalmic drops, , Disp: , Rfl:   •  metFORMIN (GLUCOPHAGE) 1000 MG tablet, Take 1,000 mg by mouth Every Evening., Disp: , Rfl:   •  metoprolol tartrate (LOPRESSOR) 100 MG tablet, Take 100 mg by mouth 2 (Two) Times a Day. 1/2 tablet in the morning, a whole tablet at night, Disp: , Rfl:   •  montelukast (SINGULAIR) 10 MG tablet, , Disp: , Rfl:   •  NEXIUM 40 MG capsule, Take 40 mg by mouth Every Night., Disp: , Rfl:   •  polyethylene glycol (MIRALAX) packet, Take 17 g by mouth Daily., Disp: , Rfl:   •  rosuvastatin (CRESTOR) 20 MG tablet, Take 20 mg by mouth Every Night., Disp: , Rfl:   •  torsemide (DEMADEX) 10 MG tablet, Take 10 mg by mouth 3 (Three) Times a Week., Disp: , Rfl:   Allergies   Allergen Reactions   • Oxycodone Anaphylaxis     \"shut all his organs down\"     Social History     Social History   • Marital status:      Spouse name: Lorelei Kwon   • Number of children: 2   • Years of education: N/A     Occupational History   • retired/[employer]      Social History Main Topics   • Smoking status: Former Smoker     Packs/day: 1.50     Years: 30.00     Types: Cigarettes     Quit date: 1995   • Smokeless tobacco: Current User     Types: Chew      Comment: stopped about 22 yrs ago   • Alcohol use Yes      Comment: a beer now and then   • Drug use: No   • Sexual activity: Defer     Other Topics Concern   • Not on file     Social History Narrative    Lives in [place], [place] with spouse     Family History   Problem " "Relation Age of Onset   • Heart attack Mother    • No Known Problems Father    • Cancer Sister    • COPD Maternal Grandmother      Review of Systems   Constitution: Negative for chills, fever, malaise/fatigue, night sweats and weight loss.   HENT: Positive for congestion. Negative for hearing loss, odynophagia and sore throat.    Cardiovascular: Positive for dyspnea on exertion and leg swelling. Negative for chest pain, orthopnea and palpitations.   Respiratory: Positive for cough, shortness of breath and wheezing. Negative for hemoptysis.    Endocrine: Negative for cold intolerance, heat intolerance, polydipsia, polyphagia and polyuria.   Hematologic/Lymphatic: Does not bruise/bleed easily.   Skin: Negative for itching and rash.   Musculoskeletal: Positive for back pain. Negative for joint pain, joint swelling and myalgias.   Gastrointestinal: Negative for abdominal pain, constipation, diarrhea, hematemesis, hematochezia, melena, nausea and vomiting.   Genitourinary: Positive for frequency. Negative for dysuria and hematuria.   Neurological: Negative for focal weakness, headaches, numbness and seizures.   Psychiatric/Behavioral: Negative for suicidal ideas.   All other systems reviewed and are negative.    Vitals:    07/06/18 0820   BP: 110/50   BP Location: Left arm   Patient Position: Sitting   Pulse: 69   Temp: 98.4 °F (36.9 °C)   SpO2: 94%   Weight: 91.6 kg (202 lb)   Height: 175.3 cm (69\")      Physical Exam   Constitutional: He is oriented to person, place, and time. He appears well-developed and well-nourished. No distress.   HENT:   Head: Normocephalic and atraumatic.   Eyes: Conjunctivae and EOM are normal. No scleral icterus.   Neck: Normal range of motion. Neck supple. No JVD present. No tracheal deviation present.   Cardiovascular: Normal rate, regular rhythm and normal heart sounds.  Exam reveals no gallop and no friction rub.    No murmur heard.  Pulmonary/Chest: Effort normal and breath sounds " normal. No stridor. No respiratory distress. He has no wheezes. He has no rales.   Abdominal: Soft. He exhibits no distension and no mass. There is no tenderness. There is no rebound and no guarding.   Musculoskeletal: Normal range of motion. He exhibits no deformity.   Lymphadenopathy:     He has no cervical adenopathy.     He has no axillary adenopathy.        Right: No supraclavicular adenopathy present.        Left: No supraclavicular adenopathy present.   Neurological: He is alert and oriented to person, place, and time.   Skin: No rash noted. No erythema.   Psychiatric: He has a normal mood and affect. His behavior is normal. Judgment and thought content normal.       Labs/Imaging:  -FNA right upper lobe lung mass 7/2/18, consistent with moderately differentiated squamous cell carcinoma.  -PET/CT performed 5/10/18, personally reviewed, demonstrates a 3.9 cm right upper lobe mass with an SUV of 11.8.  2.5 cm right paratracheal lymph node with an SUV of 10.2.  Right hemidiaphragm activity with an SUV of 4.5.  -CT of the chest performed 2/27/18, personally reviewed, demonstrates a large left pleural effusion.  There is a 3.1 cm area of consolidation in the right upper lobe with adjacent nodules.  Pleural based nodules are also present measuring 2.1 cm and 1.3 cm.  Mediastinal lymphadenopathy with a 4R node measuring 1.7 cm.      Assessment/Plan:  82 year old  male with a history of hypertension, hypercholesterolemia, diabetes, atrial fibrillation, remote tobacco abuse, COPD, recurrent right pleural effusion and right upper lobe lung nodule who is status post right Pleurx catheter insertion on 4/25/18 with subsequent PleurX removal.  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  The patient would like to pursue palliative chemotherapy.  I will arrange a medical oncology referral to discuss palliative treatment.  He is obviously not a surgical candidate and  may return to surgical clinic as needed.           Patient Active Problem List   Diagnosis   • Mass of upper lobe of right lung   • Pleural effusion on right   • COPD (chronic obstructive pulmonary disease) (CMS/HCC)

## 2018-07-20 LAB
CYTO UR: NORMAL
LAB AP CASE REPORT: NORMAL
LAB AP CLINICAL INFORMATION: NORMAL
LAB AP DIAGNOSIS COMMENT: NORMAL
LAB AP INTEGRATED ONCOLOGY, ADDENDUM: NORMAL
Lab: NORMAL
PATH REPORT.FINAL DX SPEC: NORMAL
PATH REPORT.GROSS SPEC: NORMAL

## (undated) DEVICE — PK MINOR SPLT 10

## (undated) DEVICE — GLV SURG SENSICARE MICRO PF LF 7 STRL

## (undated) DEVICE — SPNG GZ WOVN 4X4IN 12PLY 10/BX STRL

## (undated) DEVICE — CVR HNDL LT SURG ACCSSRY BLU STRL

## (undated) DEVICE — KT CATH DRN PLEURL PLEURX/SAFE/T SIL 15.5F 66CM 4BT/1000ML

## (undated) DEVICE — SUCTION CANISTER, 1000CC,SAFELINER: Brand: DEROYAL

## (undated) DEVICE — SUT MNCRYL PLS ANTIB UD 4/0 PS2 18IN